# Patient Record
Sex: MALE | Race: WHITE
[De-identification: names, ages, dates, MRNs, and addresses within clinical notes are randomized per-mention and may not be internally consistent; named-entity substitution may affect disease eponyms.]

---

## 2021-01-05 ENCOUNTER — HOSPITAL ENCOUNTER (EMERGENCY)
Dept: HOSPITAL 8 - ED | Age: 66
Discharge: HOME | End: 2021-01-05
Payer: MEDICAID

## 2021-01-05 VITALS — WEIGHT: 138.01 LBS | HEIGHT: 70 IN | BODY MASS INDEX: 19.76 KG/M2

## 2021-01-05 VITALS — SYSTOLIC BLOOD PRESSURE: 132 MMHG | DIASTOLIC BLOOD PRESSURE: 70 MMHG

## 2021-01-05 DIAGNOSIS — Z00.00: Primary | ICD-10-CM

## 2021-01-05 PROCEDURE — 99281 EMR DPT VST MAYX REQ PHY/QHP: CPT

## 2021-01-13 ENCOUNTER — HOSPITAL ENCOUNTER (EMERGENCY)
Dept: HOSPITAL 8 - ED | Age: 66
Discharge: HOME | End: 2021-01-13
Payer: MEDICAID

## 2021-01-13 VITALS — SYSTOLIC BLOOD PRESSURE: 132 MMHG | DIASTOLIC BLOOD PRESSURE: 79 MMHG

## 2021-01-13 VITALS — WEIGHT: 132.28 LBS | BODY MASS INDEX: 18.52 KG/M2 | HEIGHT: 71 IN

## 2021-01-13 DIAGNOSIS — R09.81: ICD-10-CM

## 2021-01-13 DIAGNOSIS — R05: ICD-10-CM

## 2021-01-13 DIAGNOSIS — Z20.822: ICD-10-CM

## 2021-01-13 DIAGNOSIS — G89.29: ICD-10-CM

## 2021-01-13 DIAGNOSIS — M54.5: Primary | ICD-10-CM

## 2021-01-13 PROCEDURE — 87635 SARS-COV-2 COVID-19 AMP PRB: CPT

## 2021-01-13 PROCEDURE — 99283 EMERGENCY DEPT VISIT LOW MDM: CPT

## 2021-01-13 NOTE — NUR
PT MEDICATED PER MAR SWABBED FOR COVID. PT GETTING VERBALLY AGGRESSIVE WITH 
STAFF WHEN HE DEMANDED NORCO AND WAS TOLD HE WAS NOT GETTING NARCOTIC PAIN 
MEDS. SECURITY CALLED TO ESCORT PT FROM ER AT THIS TIME AS HE REMAINS 
NONCOMPLIANT WITH MASK AND HAND WASHING

## 2021-02-03 ENCOUNTER — HOSPITAL ENCOUNTER (EMERGENCY)
Dept: HOSPITAL 8 - ED | Age: 66
LOS: 1 days | Discharge: HOME | End: 2021-02-04
Payer: MEDICAID

## 2021-02-03 VITALS — BODY MASS INDEX: 19.5 KG/M2 | HEIGHT: 72 IN | WEIGHT: 143.96 LBS

## 2021-02-03 DIAGNOSIS — M79.662: ICD-10-CM

## 2021-02-03 DIAGNOSIS — D64.9: ICD-10-CM

## 2021-02-03 DIAGNOSIS — L03.116: Primary | ICD-10-CM

## 2021-02-03 DIAGNOSIS — Z72.9: ICD-10-CM

## 2021-02-03 DIAGNOSIS — R60.0: ICD-10-CM

## 2021-02-03 LAB
<PLATELET ESTIMATE>: ADEQUATE
<PLT MORPHOLOGY>: (no result)
ALBUMIN SERPL-MCNC: 3.2 G/DL (ref 3.4–5)
ALP SERPL-CCNC: 91 U/L (ref 45–117)
ALT SERPL-CCNC: 23 U/L (ref 12–78)
ANION GAP SERPL CALC-SCNC: 10 MMOL/L (ref 5–15)
ANISOCYTOSIS BLD QL SMEAR: (no result)
BAND#(MANUAL): 0.11 X10^3/UL
BASOPHILS NFR BLD MANUAL: 2 % (ref 0–1)
BASOS#(MANUAL): 0.23 X10^3/UL (ref 0–0.1)
BILIRUB SERPL-MCNC: 0.9 MG/DL (ref 0.2–1)
BURR CELLS BLD QL SMEAR: (no result)
CALCIUM SERPL-MCNC: 8.9 MG/DL (ref 8.5–10.1)
CHLORIDE SERPL-SCNC: 103 MMOL/L (ref 98–107)
CREAT SERPL-MCNC: 1.22 MG/DL (ref 0.7–1.3)
ERYTHROCYTE [DISTWIDTH] IN BLOOD BY AUTOMATED COUNT: 15.2 % (ref 9.4–14.8)
LYMPH#(MANUAL): 1.24 X10^3/UL (ref 1–3.4)
LYMPHS% (MANUAL): 11 % (ref 22–44)
MACROCYTES BLD QL SMEAR: (no result)
MCH RBC QN AUTO: 30.9 PG (ref 27.5–34.5)
MCHC RBC AUTO-ENTMCNC: 33.7 G/DL (ref 33.2–36.2)
MD: YES
MONOS#(MANUAL): 0.68 X10^3/UL (ref 0.3–2.7)
MONOS% (MANUAL): 6 % (ref 2–9)
NEUTS BAND NFR BLD: 1 % (ref 0–7)
OVALOCYTES BLD QL SMEAR: (no result)
PLATELET # BLD AUTO: 388 X10^3/UL (ref 130–400)
PMV BLD AUTO: 7.6 FL (ref 7.4–10.4)
POLYCHROMASIA BLD QL SMEAR: (no result)
PROT SERPL-MCNC: 8.1 G/DL (ref 6.4–8.2)
RBC # BLD AUTO: 3.56 X10^6/UL (ref 4.38–5.82)
SEG#(MANUAL): 9.04 X10^3/UL (ref 1.8–6.8)
SEGS% (MANUAL): 80 % (ref 42–75)
SPHEROCYTES BLD QL SMEAR: (no result)
TARGETS BLD QL SMEAR: (no result)

## 2021-02-03 PROCEDURE — 80053 COMPREHEN METABOLIC PANEL: CPT

## 2021-02-03 PROCEDURE — 99284 EMERGENCY DEPT VISIT MOD MDM: CPT

## 2021-02-03 PROCEDURE — 93970 EXTREMITY STUDY: CPT

## 2021-02-03 PROCEDURE — 85025 COMPLETE CBC W/AUTO DIFF WBC: CPT

## 2021-02-03 PROCEDURE — 36415 COLL VENOUS BLD VENIPUNCTURE: CPT

## 2021-02-03 NOTE — NUR
DR PETE AT BEDSIDE TO ASSESS PT AND DISCUSS POC PT AGGITATED AND REQ FOOD STS 
HE HASN'T EATEN IN DAYS AND CRAWLED FROM [place] TO [place] AND BACK

## 2021-02-03 NOTE — NUR
CC OF LEFT LOWER LEG PAIN/REDNESS, AND SWELLING. PT TALKING VERY FAST AND 
MUMBLES, DIFFICULTY UNDERSTANDING HOW LONG SWELLING HAS BEEN PRESENT. PT STATES 
[facility] PT ASKED TO TAKE JEANS OFF FOR LEG 
ASSESSMENT, 2 PAIRS OF SCRUB BOTTOMS AND ONE PAIR OF JEANS REMOVED. PT ALSO HAS 
MULTIPLE JACKETS IN ROOM. PT ADMITS TO USING METH 2 DAYS AGO. PT VERY ANXIOUS 
ABOUT IV PLACEMENT AND LAB DRAW.

## 2021-02-03 NOTE — NUR
-------------------------------------------------------------------------------

            *** Note undone in [facility] - 02/03/21 at 2323 by CHERRI ***            

-------------------------------------------------------------------------------

US AT BEDSIDE

## 2021-02-03 NOTE — NUR
PT SLEEPING, WHEN WOKEN UP TO REPOSITION FOR US PT STARTS CRYING, UNABLE TO 
EXPLAIN TO RN AS TO WHY. PT CRYING BUT COOPERATIVE WITH LAYING ON BACK FOR US 
TECH.

## 2021-02-04 VITALS — DIASTOLIC BLOOD PRESSURE: 76 MMHG | SYSTOLIC BLOOD PRESSURE: 138 MMHG

## 2021-02-06 ENCOUNTER — HOSPITAL ENCOUNTER (EMERGENCY)
Dept: HOSPITAL 8 - ED | Age: 66
Discharge: LEFT BEFORE DISCHARGE | End: 2021-02-06
Payer: MEDICAID

## 2021-02-06 VITALS — DIASTOLIC BLOOD PRESSURE: 86 MMHG | SYSTOLIC BLOOD PRESSURE: 118 MMHG

## 2021-02-06 VITALS — BODY MASS INDEX: 20.64 KG/M2 | WEIGHT: 139.33 LBS | HEIGHT: 69 IN

## 2021-02-06 DIAGNOSIS — Z76.0: ICD-10-CM

## 2021-02-06 DIAGNOSIS — L03.116: Primary | ICD-10-CM

## 2021-02-06 PROCEDURE — 99283 EMERGENCY DEPT VISIT LOW MDM: CPT

## 2021-02-06 PROCEDURE — 99281 EMR DPT VST MAYX REQ PHY/QHP: CPT
